# Patient Record
Sex: MALE | Race: WHITE | NOT HISPANIC OR LATINO | Employment: FULL TIME | URBAN - METROPOLITAN AREA
[De-identification: names, ages, dates, MRNs, and addresses within clinical notes are randomized per-mention and may not be internally consistent; named-entity substitution may affect disease eponyms.]

---

## 2020-06-22 ENCOUNTER — TRANSCRIBE ORDERS (OUTPATIENT)
Dept: ADMINISTRATIVE | Facility: HOSPITAL | Age: 60
End: 2020-06-22

## 2020-06-22 DIAGNOSIS — K86.2 CYST OF PANCREAS: Primary | ICD-10-CM

## 2020-07-08 ENCOUNTER — HOSPITAL ENCOUNTER (OUTPATIENT)
Dept: MRI IMAGING | Facility: HOSPITAL | Age: 60
Discharge: HOME | End: 2020-07-08
Admitting: INTERNAL MEDICINE

## 2020-07-08 DIAGNOSIS — K86.2 CYST OF PANCREAS: ICD-10-CM

## 2020-07-08 LAB — CREAT BLDA-MCNC: 1 MG/DL (ref 0.6–1.3)

## 2020-07-08 PROCEDURE — A9577 INJ MULTIHANCE: HCPCS | Performed by: INTERNAL MEDICINE

## 2020-07-08 PROCEDURE — 0 GADOBENATE DIMEGLUMINE 529 MG/ML SOLUTION: Performed by: INTERNAL MEDICINE

## 2020-07-08 PROCEDURE — 74183 MRI ABD W/O CNTR FLWD CNTR: CPT

## 2020-07-08 PROCEDURE — 82565 ASSAY OF CREATININE: CPT

## 2020-07-08 RX ADMIN — GADOBENATE DIMEGLUMINE 16 ML: 529 INJECTION, SOLUTION INTRAVENOUS at 14:23

## 2022-01-11 ENCOUNTER — TRANSCRIBE ORDERS (OUTPATIENT)
Dept: ADMINISTRATIVE | Facility: HOSPITAL | Age: 62
End: 2022-01-11

## 2022-01-11 DIAGNOSIS — K86.2 CYST OF PANCREAS: Primary | ICD-10-CM

## 2022-01-17 ENCOUNTER — HOSPITAL ENCOUNTER (OUTPATIENT)
Dept: MRI IMAGING | Facility: HOSPITAL | Age: 62
Discharge: HOME | End: 2022-01-17
Admitting: INTERNAL MEDICINE

## 2022-01-17 DIAGNOSIS — K86.2 CYST OF PANCREAS: ICD-10-CM

## 2022-01-17 PROCEDURE — 0 GADOBENATE DIMEGLUMINE 529 MG/ML SOLUTION: Performed by: INTERNAL MEDICINE

## 2022-01-17 PROCEDURE — A9577 INJ MULTIHANCE: HCPCS | Performed by: INTERNAL MEDICINE

## 2022-01-17 PROCEDURE — 82565 ASSAY OF CREATININE: CPT

## 2022-01-17 PROCEDURE — 74183 MRI ABD W/O CNTR FLWD CNTR: CPT

## 2022-01-17 RX ADMIN — GADOBENATE DIMEGLUMINE 18 ML: 529 INJECTION, SOLUTION INTRAVENOUS at 13:11

## 2022-01-26 LAB — CREAT BLDA-MCNC: 1.1 MG/DL (ref 0.6–1.3)

## 2025-04-14 ENCOUNTER — OFFICE VISIT (OUTPATIENT)
Dept: ORTHOPEDIC SURGERY | Facility: CLINIC | Age: 65
End: 2025-04-14
Payer: MEDICARE

## 2025-04-14 VITALS
SYSTOLIC BLOOD PRESSURE: 130 MMHG | DIASTOLIC BLOOD PRESSURE: 76 MMHG | WEIGHT: 185 LBS | BODY MASS INDEX: 26.48 KG/M2 | HEIGHT: 70 IN

## 2025-04-14 DIAGNOSIS — M25.561 RIGHT KNEE PAIN, UNSPECIFIED CHRONICITY: Primary | ICD-10-CM

## 2025-04-14 PROCEDURE — 99203 OFFICE O/P NEW LOW 30 MIN: CPT | Performed by: ORTHOPAEDIC SURGERY

## 2025-04-14 PROCEDURE — 1159F MED LIST DOCD IN RCRD: CPT | Performed by: ORTHOPAEDIC SURGERY

## 2025-04-14 PROCEDURE — 1160F RVW MEDS BY RX/DR IN RCRD: CPT | Performed by: ORTHOPAEDIC SURGERY

## 2025-04-14 RX ORDER — AMLODIPINE BESYLATE 5 MG/1
1 TABLET ORAL DAILY
COMMUNITY

## 2025-04-14 RX ORDER — ROSUVASTATIN CALCIUM 5 MG/1
TABLET, COATED ORAL
COMMUNITY
Start: 2025-01-19

## 2025-04-14 NOTE — PROGRESS NOTES
[facility] [place] Note        Subjective     Pain of the Right Knee      HPI    Santos Clarke is a 64 y.o. male.  New patient with right knee pain started 4 months ago after a trip and fall.  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  He walks 5 miles a day.  No previous knee injury pain problems with treatment.  Pain is on the medial side of his knee.  No mechanical symptoms.  Very minimal to no pain today.    History reviewed. No pertinent past medical history.   Past Surgical History:   Procedure Laterality Date    CHOLECYSTECTOMY      MELANOMA WIDE LOCAL EXCISION      elbow      Family History   Problem Relation Age of Onset    Dementia Mother     Stroke Mother     Hypertension Mother     Melanoma Sister      Social History     Socioeconomic History    Marital status:    Tobacco Use    Smoking status: Never     Passive exposure: Never    Smokeless tobacco: Never   Vaping Use    Vaping status: Never Used   Substance and Sexual Activity    Alcohol use: Yes     Alcohol/week: 1.0 - 2.0 standard drink of alcohol     Types: 1 - 2 Glasses of wine per week    Drug use: Never    Sexual activity: Defer      Current Outpatient Medications on File Prior to Visit   Medication Sig Dispense Refill    amLODIPine (NORVASC) 5 MG tablet Take 1 tablet by mouth Daily.      cholecalciferol (VITAMIN D3) 1.25 MG (59129 UT) capsule Vitamin D3   1,ooo units daily      rosuvastatin (CRESTOR) 5 MG tablet        No current facility-administered medications on file prior to visit.      No Known Allergies       Review of Systems   Constitutional: Negative.    HENT: Negative.     Eyes: Negative.    Respiratory: Negative.     Cardiovascular: Negative.    Gastrointestinal: Negative.    Endocrine: Negative.    Genitourinary: Negative.    Musculoskeletal:  Positive for arthralgias.   Skin: Negative.    Allergic/Immunologic: Negative.    Neurological: Negative.    Hematological: Negative.   "  Psychiatric/Behavioral: Negative.          I reviewed the patient's chief complaint, history of present illness, review of systems, past medical history, surgical history, family history, social history, medications and allergy list.        Objective      Physical Exam  /76   Ht 179 cm (70.47\")   Wt 83.9 kg (185 lb)   BMI 26.19 kg/m²     Body mass index is 26.19 kg/m².    General  Mental Status - alert  General Appearance - cooperative, well groomed, not in acute distress  Orientation - Oriented X3  Build & Nutrition - well developed and well nourished  Posture - normal posture  Gait - normal gait       Ortho Exam  Right knee has full motion no swelling no effusion.  Stable ligaments.  Minimal tenderness to medial joint line.  Negative Mandy.  Stable ligaments.  Negative straight leg raise.  No pain with hip range of motion.  No tenderness to the IT band    Imaging/Studies Reviewed and Interpreted:  Imaging Results (Last 24 Hours)       Procedure Component Value Units Date/Time    XR Knee 4+ View Right [[id number]] Resulted: 04/14/25 1003     Updated: 04/14/25 1003    Narrative:      Knee X-Rays  Indication: Pain    Upright AP of bilateral knees. Lateral, skiers and Sunrise views of right   knee     Findings:  No fracture  No bony lesion  Normal soft tissues  Normal joint spaces    No prior studies were available for comparison.                Assessment    Assessment:  1. Right knee pain, unspecified chronicity        Plan:  Continue over-the-counter medication as needed for discomfort  I ordered physical therapy.  He does plan to go to [place].  He will take over-the-counter anti-inflammatories.  We discussed doing a cortisone injection but he is not having much pain today.  He will follow-up as needed.        Bill Levine MD  04/14/25  10:11 EDT      Dictated Utilizing Dragon Dictation.    "

## 2025-04-23 ENCOUNTER — TREATMENT (OUTPATIENT)
Dept: PHYSICAL THERAPY | Facility: CLINIC | Age: 65
End: 2025-04-23
Payer: MEDICARE

## 2025-04-23 DIAGNOSIS — G89.29 CHRONIC PAIN OF RIGHT KNEE: Primary | ICD-10-CM

## 2025-04-23 DIAGNOSIS — M25.561 CHRONIC PAIN OF RIGHT KNEE: Primary | ICD-10-CM

## 2025-04-23 DIAGNOSIS — R29.898 IMPAIRED FLEXIBILITY OF LOWER EXTREMITY: ICD-10-CM

## 2025-04-23 NOTE — PROGRESS NOTES
Physical Therapy Initial Evaluation and Plan of Care    [facility]    [address], [address] [place] [address]    Patient: Santos Clarke   : 1960  Diagnosis/ICD-10 Code:  Chronic pain of right knee [M25.561, G89.29]  Referring practitioner: Bill Levine MD  Date of Initial Visit: 2025  Patient seen for 1 session         Visit Diagnoses:    ICD-10-CM ICD-9-CM   1. Chronic pain of right knee  M25.561 719.46    G89.29 338.29   2. Impaired flexibility of lower extremity  R29.898 781.99         Subjective Questionnaire: LEFS: 74/80      Subjective Evaluation    History of Present Illness  Mechanism of injury: Santos Clarke presents to the physical therapy clinic with complaints of medial right knee pain following mechanical fall with direct impact; notes that pain didn't really occur until multiple months after injury. To date patient has experienced unchanged pain levels and functional ability. Specifically, they report having difficulty with prolonged standing, twisting motions and soreness after prolonged walking. Other specific details include: does note some proximal radiating.    Patients PLOF / activity level at baseline: walks 4-5 miles every other day, swimming every other day. Pain only noted with breast stroke.    Contributory past medical history / complicating factors include: no history of knee injury on involved extremity; denies distal / proximal injuries .      Further psychosocial details include: goes to gym in [facility] and is currently retired.         Quality of life: good    Pain  Current pain ratin  At best pain ratin  At worst pain rating: 3  Quality: sharp and tight  Relieving factors: support, rest and relaxation    Social Support  Lives with: spouse    Diagnostic Tests  X-ray: normal    Treatments  Current treatment: physical therapy  Patient Goals  Patient goals for therapy: decreased pain, increased motion, increased strength,  independence with ADLs/IADLs and return to sport/leisure activities             - There are no active problems to display for this patient.      - No past medical history on file.    -   Past Surgical History:   Procedure Laterality Date    CHOLECYSTECTOMY      MELANOMA WIDE LOCAL EXCISION      elbow        Objective          Tenderness     Right Knee   Tenderness in the inferior fat pad, medial joint line and pes anserinus.     Active Range of Motion   Left Knee   Normal active range of motion    Right Knee   Normal active range of motion    Additional Active Range of Motion Details  Pain at end-range for right knee    Passive Range of Motion     Additional Passive Range of Motion Details  Relatively minimal deficits in hip mobility with passive range of motion. Some provocation in pain with end-range IR in 90/90    Strength/Myotome Testing     Left Hip   Normal muscle strength    Right Hip   Planes of Motion   Flexion: 4+  Extension: 5  Abduction: 4+  External rotation: 4+ (pain in medial knee with resistance)  Internal rotation: 4+    Left Knee   Normal strength    Right Knee   Flexion: 4+  Extension: 4+  Quadriceps contraction: fair    Right Knee Flexibility Comments:   Significant limitations in HS flexibility with passive SLR     50 deg before resistance noted per visual observation    Functional Assessment     Comments  Pain-free functional mobility with SLS and squat; mild pain provocation with forward lunge on right knee with either leg forward.          Assessment & Plan       Assessment  Impairments: abnormal gait, abnormal muscle firing, abnormal or restricted ROM, activity intolerance, impaired balance, impaired physical strength, lacks appropriate home exercise program, pain with function and weight-bearing intolerance   Functional limitations: carrying objects, lifting, sleeping, walking, uncomfortable because of pain, moving in bed, sitting, standing and unable to perform repetitive tasks    Assessment details: Upon initial physical therapy evaluation, the patient presents with acute on chronic right knee pain without radiating or radicular symptoms; pain pattern is most consistent with pes anserine bursitis and/or MCL strain with associated meniscal injury. Primary deficits noted during visit today include pain with end-range motion/twisting, reduced hamstring flexibility and mild deficits in strength on involved limb. These deficits are limiting his ability to perform ADLs/IADLs, functional mobility / transfers, and recreational tasks.     Patient with good response to initial therapeutic treatment. Verbalized understanding of plan of care and home exercise instructions.     The patient will benefit from skilled physical therapy services to address the listed deficits for improved functional capacity and enhanced quality of life.        Goals  Plan Goals: SHORT TERM GOALS:  4 weeks       1. Pt independent with HEP  2. Pt to demonstrate patricio hip strength 4/5 or greater to improve stability with ambulation  3. Pt to report being able to walk for 10 minutes without increasing pain in the right knee  4. Pt to improve by MCID of score on LEFS to demonstrate improved function and enhanced QOL     LONG TERM GOALS:   8 weeks  1. Pt to demonstrate ability to perform full functional squat with good form and control of the knees and without increasing pain  2. Pt to demonstrate patricio hip strength to 4+/5 or greater to improve safety with ambulation on uneven surfaces  3. Pt to return to work full duty without increased pain in the right knee   4. Pt to demonstrate ability to perform step up/down 8 inch step x10 safely and without pain in the right knee       Plan  Therapy options: will be seen for skilled therapy services  Planned modality interventions: cryotherapy, dry needling, electrical stimulation/Russian stimulation, thermotherapy (hydrocollator packs), ultrasound, TENS and iontophoresis  Planned therapy  interventions: manual therapy, motor coordination training, neuromuscular re-education, postural training, soft tissue mobilization, spinal/joint mobilization, strengthening, stretching, therapeutic activities, joint mobilization, IADL retraining, gait training, home exercise program, functional ROM exercises, flexibility, body mechanics training and abdominal trunk stabilization  Frequency: 1x week  Duration in weeks: 12  Treatment plan discussed with: patient        History # of Personal Factors and/or Comorbidities: LOW (0)  Examination of Body System(s): # of elements: LOW (1-2)  Clinical Presentation: STABLE   Clinical Decision Making: LOW       Timed:         Manual Therapy:         mins  95140;     Therapeutic Exercise:    20     mins  08641;     Neuromuscular Thomas:    12    mins  06306;    Therapeutic Activity:          mins  71877;     Gait Training:           mins  62604;     Ultrasound:          mins  71273;    Ionto                                   mins   86372  Self Care                            mins   14275  Canalith Repos         mins 92667      Un-Timed:  Electrical Stimulation:        mins  82394 (MC );  Dry Needling         mins self-pay  Traction          mins 41462  Low Eval     30     Mins  23758  Mod Eval          Mins  90217  High Eval                            Mins  97360        Timed Treatment:   32   mins   Total Treatment:     62   mins    Next Visit:  Continue with resisted ER  Hip mobility / hamstring  CKC strength      Visit and Documentation completed by:  Darrian Flowers PT,  DPT, Cert. DN   [place] License Number: [id number]    Electronically signed by Darrian Flowers PT, 04/23/25, 11:06 AM EDT    Certification Period: 4/25/2025 thru 7/23/2025  I certify that the therapy services are furnished while this patient is under my care.  The services outlined above are required by this patient, and will be reviewed every 90 days.         Physician  Signature:__________________________________________________    PHYSICIAN: Bill Levine MD  NPI: [id number]                                      DATE:      Please sign and return via fax to [email or web address] . Thank you, [facility].

## 2025-05-19 ENCOUNTER — TELEPHONE (OUTPATIENT)
Dept: PHYSICAL THERAPY | Facility: CLINIC | Age: 65
End: 2025-05-19
Payer: MEDICARE

## 2025-05-19 NOTE — TELEPHONE ENCOUNTER
Caller: Santos Clarke    Relationship: Self    Best call back number:   Telephone Information:   Mobile [phone]       Who are you requesting to speak with (clinical staff, provider,  specific staff member): JENIFFER ASTUDILLO        What was the call regarding: PATIENT WAS JUST DX WITH Lumbago with sciatica, left side IS WANTING TO KNOW IF THERE IS ANYTHING ELSE HE NEEDS TO DO OR CHANGE WITH THIS NEW DX.

## 2025-06-10 ENCOUNTER — TREATMENT (OUTPATIENT)
Dept: PHYSICAL THERAPY | Facility: CLINIC | Age: 65
End: 2025-06-10
Payer: MEDICARE

## 2025-06-10 DIAGNOSIS — M54.32 SCIATICA OF LEFT SIDE: ICD-10-CM

## 2025-06-10 DIAGNOSIS — M25.561 ACUTE PAIN OF RIGHT KNEE: Primary | ICD-10-CM

## 2025-06-10 NOTE — PROGRESS NOTES
Physical Therapy Initial Evaluation and Plan of Care    [facility]    [address], [address] [place] [address]    Patient: Santos Clarke   : 1960  Diagnosis/ICD-10 Code:  Acute pain of right knee [M25.561]  Referring practitioner: Ronald Palacios MD  Date of Initial Visit: 6/10/2025  Patient seen for 1 session         Visit Diagnoses:    ICD-10-CM ICD-9-CM   1. Acute pain of right knee  M25.561 719.46   2. Sciatica of left side  M54.32 724.3         Subjective Questionnaire: LEFS: 69/80      Subjective Evaluation    History of Present Illness  Mechanism of injury: Santos Clarke presents to the physical therapy clinic with complaints of right knee pain and previous onset of sciatic pain in low back going down his left leg. Patient notes that this occurred after previous physical therapy evaluation for right knee pain and then stopped therapy for knee as instructed from our office. Since has taken prednisone for back and knee pain which has helped. To date patient is most concerned with ongoing pain in right knee and antalgic gait.    To date patient has experienced unchanged and/or stabilized pain levels and functional ability. Specifically, they report having difficulty with walking, deep knee bending and any recreational activity. Other specific details include: patient notes     Contributory past medical history / health condition / complicating factors include: patient with onset of low back symptoms with distal nerve pain and noted with ankle foot pain bilaterally treated with orthopedic inserts    Patients PLOF / activity level at baseline: regularly active, gym routine and walking. Limited recently by injury.      Further personal and environmental factors: wanting to get back into gym and yoga routine.         Pain  Current pain ratin  At best pain ratin  At worst pain ratin  Quality: pulling, knife-like and tight  Aggravating factors: stairs, standing and  squatting    Diagnostic Tests  X-ray: normal    Treatments  Previous treatment: physical therapy  Current treatment: physical therapy  Patient Goals  Patient goals for therapy: increased strength and decreased pain  Patient goal: improved gait           - There are no active problems to display for this patient.      - No past medical history on file.    -   Past Surgical History:   Procedure Laterality Date    CHOLECYSTECTOMY      MELANOMA WIDE LOCAL EXCISION      elbow        Objective          Tenderness     Right Knee   Tenderness in the medial joint line, medial patella and medial retinaculum.     Active Range of Motion     Additional Active Range of Motion Details  Mild pain with end-range flexion on right knee but WFL compared to left    Patellar Mobility     Right Knee Hypomobile in the lateral and superior patellar tendon(s).     Strength/Myotome Testing     Right Hip   Planes of Motion   Flexion: 4+  Extension: 4+  Abduction: 4  Adduction: 4+  External rotation: 4  Internal rotation: 4    Right Knee   Flexion: 4+  Extension: 4+  Quadriceps contraction: fair    Right Hip Flexibility Comments:   Reduced flexibility noted with SLR on right leg with about 60 deg flexion before mm end-feel    Ambulation     Quality of Movement During Gait     Knee    Knee (Right): Positive stiff knee and quad avoidance.     Functional Assessment   Squat   Trunk lean left.           Assessment & Plan       Assessment  Impairments: abnormal gait, abnormal muscle firing, abnormal or restricted ROM, activity intolerance, impaired balance, impaired physical strength, lacks appropriate home exercise program, pain with function and weight-bearing intolerance   Functional limitations: carrying objects, lifting, sleeping, walking, uncomfortable because of pain, moving in bed, sitting, standing and unable to perform repetitive tasks   Assessment details: Upon initial physical therapy evaluation, the patient presents with acute right knee  pain without radiating or radicular symptoms on involved side; pain pattern is most consistent with anterior knee pain syndrome and possible strain of MCL ligament secondary to hip weakness and limited LE flexibility. Additionally noted today is patient wearing orthopedic inserts placing foot in more supinated position possible contributing to medial knee stress and pain cycle with functional mobility. Primary deficits noted during visit today include antalgic gait, pain with functional mobility and limited flexibility of RLE. These deficits are limiting his ability to perform ADLs/IADLs, functional mobility / transfers, and recreational tasks.     Patient with good response to initial therapeutic treatment. Verbalized understanding of plan of care and home exercise instructions.     The patient will benefit from skilled physical therapy services to address the listed deficits for improved functional capacity and enhanced quality of life.        Goals  Plan Goals: SHORT TERM GOALS:  4 weeks       1. Pt independent with HEP  2. Pt to demonstrate patricio hip strength 4/5 or greater to improve stability with ambulation  3. Pt to report being able to walk for 10 minutes without increasing pain in the right knee  4. Pt to improve by MCID of score on LEFS to demonstrate improved function and enhanced QOL     LONG TERM GOALS:   8 weeks  1. Pt to demonstrate ability to perform full functional squat with good form and control of the knees and without increasing pain  2. Pt to demonstrate patricio hip strength to 4+/5 or greater to improve safety with ambulation on uneven surfaces  3. Pt to return to work full duty without increased pain in the right knee   4. Pt to demonstrate ability to perform step up/down 8 inch step x10 safely and without pain in the right knee       Plan  Therapy options: will be seen for skilled therapy services  Planned modality interventions: cryotherapy, dry needling, electrical stimulation/Russian  stimulation, thermotherapy (hydrocollator packs), ultrasound, TENS and iontophoresis  Planned therapy interventions: manual therapy, motor coordination training, neuromuscular re-education, postural training, soft tissue mobilization, spinal/joint mobilization, strengthening, stretching, therapeutic activities, joint mobilization, IADL retraining, gait training, home exercise program, functional ROM exercises, flexibility, body mechanics training and abdominal trunk stabilization  Duration in weeks: 12  Treatment plan discussed with: patient        History # of Personal Factors and/or Comorbidities: LOW (0)  Examination of Body System(s): # of elements: LOW (1-2)  Clinical Presentation: STABLE   Clinical Decision Making: LOW       Timed:         Manual Therapy:         mins  59833;     Therapeutic Exercise:    15     mins  83399;     Neuromuscular Thomas:        mins  17813;    Therapeutic Activity:      15    mins  92899;     Gait Training:           mins  15390;     Ultrasound:          mins  07104;    Ionto                                   mins   57248  Self Care                            mins   43202  Canalith Repos         mins 64992      Un-Timed:  Electrical Stimulation:         mins  05666 (MC );  Dry Needling          mins self-pay  Traction          mins 75586  Low Eval     25     Mins  15507  Mod Eval          Mins  49552  High Eval                            Mins  16601        Timed Treatment:   30   mins   Total Treatment:     55   mins    Next Visit:  Progress quad and CKC activity       Visit and Documentation completed by:  Darrian Flowers PT,  DPT, Cert. DN   [place] License Number: [id number]    Electronically signed by Darrian Flowers PT, 06/10/25, 10:42 AM EDT    Certification Period: 6/11/2025 thru 9/8/2025  I certify that the therapy services are furnished while this patient is under my care.  The services outlined above are required by this patient, and will be reviewed every 90 days.         Physician  Signature:__________________________________________________    PHYSICIAN: Ronald Palacios MD  NPI: [id number]                                      DATE:      Please sign and return via fax to [email or web address] . Thank you, [facility].

## 2025-07-10 ENCOUNTER — TREATMENT (OUTPATIENT)
Dept: PHYSICAL THERAPY | Facility: CLINIC | Age: 65
End: 2025-07-10
Payer: MEDICARE

## 2025-07-10 DIAGNOSIS — M25.561 ACUTE PAIN OF RIGHT KNEE: Primary | ICD-10-CM

## 2025-07-10 DIAGNOSIS — M54.32 SCIATICA OF LEFT SIDE: ICD-10-CM

## 2025-07-10 NOTE — PROGRESS NOTES
Physical Therapy Re Certification Of Plan of Care    [facility]   [address], [address] [place] [address]    Patient: Santos Clarke   : 1960  Diagnosis/ICD-10 Code:  Acute pain of right knee [M25.561]  Referring practitioner: Ronald Palacios MD  Date of Initial Visit: Type: THERAPY  Noted: 6/10/2025  Today's Date: 7/10/2025  Patient seen for 2 sessions         Visit Diagnoses:    ICD-10-CM ICD-9-CM   1. Acute pain of right knee  M25.561 719.46   2. Sciatica of left side  M54.32 724.3       Clinical Progress: improved  Home Program Compliance: Yes  Treatment has included: therapeutic exercise, neuromuscular re-education, manual therapy, and therapeutic activity      Subjective     Santos Clarke presents to the physical therapy clinic today reporting stable  symptom pattern following their previous therapy visit. They state good compliance to home interventions; continues with all interventions without questions or concerns. Other reports from patient during visit today include: 2/10 can go up to 3-4/10. Notes that he can walk faster and has less pain in severity.      Objective       Assessment/Plan       Recommendations: Continue as planned  Remaining Timeframe: 1 month / Lapsed Timeframe: 5 weeks  Prognosis to achieve goals: good    Goals  Plan Goals: SHORT TERM GOALS:  4 weeks       1. Pt independent with HEP  2. Pt to demonstrate patricio hip strength 4/5 or greater to improve stability with ambulation  3. Pt to report being able to walk for 10 minutes without increasing pain in the right knee  4. Pt to improve by MCID of score on LEFS to demonstrate improved function and enhanced QOL     Met all short term goals to date      LONG TERM GOALS:   8 weeks  1. Pt to demonstrate ability to perform full functional squat with good form and control of the knees and without increasing pain  2. Pt to demonstrate patricio hip strength to 4+/5 or greater to improve safety with ambulation on  uneven surfaces  3. Pt to return to work full duty without increased pain in the right knee   4. Pt to demonstrate ability to perform step up/down 8 inch step x10 safely and without pain in the right knee     Progressing on all long term goals to date      During physical therapy session, patient demonstrates fair progress with functional activity, pain levels and progress towards therapy goals from previous visits. Progress specifically noted during today's session includes still having pain in his right knee with walking; specifically noted today is reduced flexibility in proximal hip as possible to further contribution to ongoing knee pain with strain during physical activity.     Remaining deficits still noted during session today are reduced / painful knee range of motion, quadriceps weakness/reduced activation, and reduced / painful hip range of motion.     Santos Clarke will continue to benefit from skilled physical therapy services to address remaining deficits and continue to work towards reaching functional goals. Changes made today were stretches at home.      Timed:         Manual Therapy:         mins  26310;     Therapeutic Exercise:    25     mins  92003;     Neuromuscular Thomas:        mins  50551;    Therapeutic Activity:     10     mins  31205;     Gait Training:           mins  62643;     Ultrasound:          mins  24514;    Ionto                                   mins   53822  Self Care                            mins   98015  Canalith Repos         mins 39917      Un-Timed:  Electrical Stimulation:         mins  05018 (MC );  Dry Needling          mins self-pay  Traction          mins 73062  Re-Eval                               mins  02931  Group Therapy  ____ mins 05287      Timed Treatment:   35   mins   Total Treatment:     40   mins    Next Visit:  Continue with stretching / mobility of hips    Visit and Documentation completed by:  Darrian Flowers, PT,  DPT, Cert. [employer] License  Number: [id number]    Electronically signed by Darrian Flowers PT, 07/10/25, 9:59 AM EDT    Certification Period: 7/10/2025 thru 10/7/2025  I certify that the therapy services are furnished while this patient is under my care.  The services outlined above are required by this patient, and will be reviewed every 90 days.         Physician Signature:__________________________________________________    PHYSICIAN: Ronald Palacios MD  NPI: [id number]                                      DATE:  :     Please sign and return via fax to [email or web address] . Thank you, [facility]

## 2025-07-22 ENCOUNTER — TREATMENT (OUTPATIENT)
Dept: PHYSICAL THERAPY | Facility: CLINIC | Age: 65
End: 2025-07-22
Payer: MEDICARE

## 2025-07-22 DIAGNOSIS — M54.32 SCIATICA OF LEFT SIDE: ICD-10-CM

## 2025-07-22 DIAGNOSIS — M25.561 ACUTE PAIN OF RIGHT KNEE: Primary | ICD-10-CM

## 2025-07-22 PROCEDURE — 97110 THERAPEUTIC EXERCISES: CPT

## 2025-07-22 PROCEDURE — 97530 THERAPEUTIC ACTIVITIES: CPT

## 2025-07-22 NOTE — PROGRESS NOTES
Physical Therapy Daily Treatment Note    [facility]   [address], [address] [place] [address]      Patient: Santos Clarke   : 1960  Referring practitioner: Ronald Palacios MD  Date of Initial Visit: Type: THERAPY  Noted: 6/10/2025  Patient seen for 3 sessions    Certification Period 2025 thru 10/19/2025       Visit Diagnoses:    ICD-10-CM ICD-9-CM   1. Acute pain of right knee  M25.561 719.46   2. Sciatica of left side  M54.32 724.3       Subjective     Santos Clarke presents to the physical therapy clinic today reporting stable symptom pattern following their previous therapy visit. They state good compliance to home interventions; has also been going to the gym and walking in the water. Other reports from patient during visit today include: pain in knee ~1-2/10.    Objective   See Exercise, Manual, and Modality Logs for complete treatment.       Assessment/Plan     During physical therapy session, patient demonstrates good response to treatment, progress with functional activity, pain levels and progress towards therapy goals from previous visits. Remarkable findings specifically noted during today's session includes pain provoked with hs stretch and some quad activity. Main limiting factors noted include reduced mobility in hips and altered gait mechanics noted today possible due to reduced flexibility. Patient with good performance of CKC and OKC quad setting.    Remaining deficits still noted during session today are knee pain and reduced hip flexibility.     Santos Clarke will continue to benefit from skilled physical therapy services to address deficits and continue to work towards reaching functional goals. No changes to current PT POC, patient will continue AT.       Timed:         Manual Therapy:         mins  86788;     Therapeutic Exercise:    15     mins  94609;     Neuromuscular Thomas:        mins  09534;    Therapeutic Activity:     14     mins  53637;      Gait Training:           mins  48681;     Ultrasound:          mins  70037;    Ionto                                   mins   97267  Self Care                            mins   78641  Canalith Repos         mins 34638  Electrical Stimulation:         mins  51041    Un-Timed:  Electrical Stimulation:         mins  46256 ( );  Dry Needling          mins self-pay  Traction          mins 86293  Group Therapy  ___ mins 60257      Timed Treatment:   29   mins   Total Treatment:     48   mins    Visit and Documentation completed by:  Darrian Flowers, PT,  DPT, Cert. [employer] License Number: [id number]

## 2025-07-29 ENCOUNTER — TREATMENT (OUTPATIENT)
Dept: PHYSICAL THERAPY | Facility: CLINIC | Age: 65
End: 2025-07-29
Payer: MEDICARE

## 2025-07-29 DIAGNOSIS — M25.561 ACUTE PAIN OF RIGHT KNEE: Primary | ICD-10-CM

## 2025-07-29 DIAGNOSIS — M54.32 SCIATICA OF LEFT SIDE: ICD-10-CM

## 2025-07-29 PROCEDURE — 97110 THERAPEUTIC EXERCISES: CPT

## 2025-07-29 NOTE — PROGRESS NOTES
Physical Therapy Daily Treatment Note    [facility]   [address], [address] [place] [address]      Patient: Santos Clarke   : 1960  Referring practitioner: Ronald Palacios MD  Date of Initial Visit: Type: THERAPY  Noted: 6/10/2025  Patient seen for 4 sessions    Certification Period 2025 thru 10/26/2025       Visit Diagnoses:    ICD-10-CM ICD-9-CM   1. Acute pain of right knee  M25.561 719.46   2. Sciatica of left side  M54.32 724.3       Subjective     Santos Clarke presents to the physical therapy clinic today reporting stable symptom pattern following their previous therapy visit. They state good compliance to home interventions. Other reports from patient during visit today include: notes some increased soreness in his right thigh today.    Objective   See Exercise, Manual, and Modality Logs for complete treatment.       Assessment/Plan     During physical therapy session, patient demonstrates good response to treatment, progress with functional activity, pain levels and progress towards therapy goals from previous visits. Remarkable findings specifically noted during today's session includes ongoing compliance to overall activity and HEP.     Remaining deficits still noted during session today are hip mm tightness with distal symptoms consequent.     Santos Clarke will continue to benefit from skilled physical therapy services to address deficits and continue to work towards reaching functional goals. Changes made today were flexor stretching at home.      Timed:         Manual Therapy:         mins  54932;     Therapeutic Exercise:    30     mins  34358;     Neuromuscular Thomas:        mins  13314;    Therapeutic Activity:          mins  22348;     Gait Training:           mins  33541;     Ultrasound:          mins  00767;    Ionto                                   mins   23121  Self Care                            mins   14301  Canalith Repos         mins  23677  Electrical Stimulation:         mins  09760    Un-Timed:  Electrical Stimulation:         mins  66910 ( );  Dry Needling          mins self-pay  Traction          mins 06604  Group Therapy  ___ mins 81895      Timed Treatment:   30   mins   Total Treatment:     40   mins    Visit and Documentation completed by:  Darrian Flowers, PT,  DPT, Cert. [employer] License Number: [id number]